# Patient Record
(demographics unavailable — no encounter records)

---

## 2024-12-13 NOTE — ASSESSMENT
[FreeTextEntry1] :   Colonoscopy scheduled - Indications: screening - Reviewed importance of adequate colon cleansing prior to colonoscopy. Instructed to contact office if he/she has any questions regarding prep. - Explained the risks (including but not limited to cardiopulmonary anesthetic complications, bleeding, perforation, aspiration, missed lesions and misidentified sites of lesions - complications that might necessitate hospitalization or surgery), benefits and alternatives of colonoscopy were reviewed with the patient. Preparation for the procedure was reviewed with the patient. The patient was informed that she/he would be given intravenous anesthesia by an anesthesiologist for the procedure. The patient was informed that a family member or friend must drive the patient following recovery from the procedure. Patient understands and agrees, all questions answered. - Holds: none  PMD/consultation/hospital notes and Labs/imaging/prior endoscopic results reviewed to extent noted in HPI; and, if procedure code billed on this visit for lab draw, this serves to signify that labs were drawn here in this office. Pt also advised that any studies ordered, if prior authorization required it may take up to a week to confirm - and if pt has not heard RE: scheduling by a week, they should call this office.

## 2024-12-13 NOTE — HISTORY OF PRESENT ILLNESS
[FreeTextEntry1] : 46F PMH ITP (20 yrs ago s/p rituxin x1 recheck CBC regularly with PCP, which has been normal), HLD, hypothyroidism. Presents for pre-colonoscopy evaluation.  First colonoscopy.   Denies change in bowel habits, constipation, N/V/D, rectal bleeding, abdominal pain, bloating, unintentional weight loss, early satiety, dysphagia. Denies heartburn or reflux on a regular basis. Denies hematochezia or melena. BM every 1-2 days; soft, brown stools that are easily passed.     Soc: no tobacco or significant EtOH FHx: no FHx GI malignancy or IBD  ROS: Constitutional:: no weight loss, fevers ENT: no deafness Eyes: not blind Neck: no LN Chest: no dyspnea/cough Cardiac: no chest pain Vascular: no leg swelling GI: no abdominal pain, nausea, vomiting, diarrhea, constipation, rectal bleeding, dysphagia, melena unless otherwise noted in HPI : no dysuria, dark urine Skin: no rashes, jaundice Heme: no bleeding Endocrine: no DM unless otherwise stated in HPI  Px: (VS noted below) General: NAD Eyes: anicteric Oropharynx: clear Neck: no LN Chest: normal respiratory effort CVS: regular Abd: soft, NT, ND, +BS, no HSM Ext: no atrophy Neuro: grossly nonfocal  Labs/imaging/prior endoscopic results reviewed to the extent available and noted in HPI

## 2024-12-13 NOTE — CONSULT LETTER
[FreeTextEntry1] : Dear Dr. JACQUES MORITZ ,  I had the pleasure of evaluating your patient,  MARLENE CHAVARRIA.  Please refer to my note below.  Thank you very much for allowing me to participate in the care of this patient.  If you have any questions, please do not hesitate to contact me.  Sincerely,   George Verde MD

## 2024-12-13 NOTE — REASON FOR VISIT
[Consultation] : a consultation visit [FreeTextEntry1] : Kindly asked by Dr. Moritz to consult and evaluate patient for        colon screening             A copy of this note is being sent to physician requesting consultation.